# Patient Record
Sex: MALE | Race: WHITE | URBAN - NONMETROPOLITAN AREA
[De-identification: names, ages, dates, MRNs, and addresses within clinical notes are randomized per-mention and may not be internally consistent; named-entity substitution may affect disease eponyms.]

---

## 2021-04-19 ENCOUNTER — OFFICE VISIT (OUTPATIENT)
Dept: URBAN - NONMETROPOLITAN AREA CLINIC 19 | Facility: CLINIC | Age: 36
End: 2021-04-19
Payer: MEDICARE

## 2021-04-19 DIAGNOSIS — E11.9 TYPE 2 DIABETES MELLITUS W/O COMPLICATION: Primary | ICD-10-CM

## 2021-04-19 PROCEDURE — 92004 COMPRE OPH EXAM NEW PT 1/>: CPT | Performed by: OPHTHALMOLOGY

## 2021-04-19 ASSESSMENT — VISUAL ACUITY: OS: 20/20

## 2021-04-19 ASSESSMENT — INTRAOCULAR PRESSURE
OD: 9
OS: 12

## 2021-04-19 NOTE — IMPRESSION/PLAN
Impression: Type 2 diabetes mellitus w/o complication: W06.5. Plan: No evidence of diabetic retinopathy seen on today's exam in either eye. Ophthalmic pathologies associated with diabetes explained to patient. I have stressed the importance of patient maintaining good blood sugar control. I will continue to monitor patient's ocular status closely. Patient will return for follow-up appointment and repeat dilation.

## 2021-04-21 ENCOUNTER — OFFICE VISIT (OUTPATIENT)
Dept: URBAN - METROPOLITAN AREA CLINIC 91 | Facility: CLINIC | Age: 36
End: 2021-04-21
Payer: MEDICARE

## 2021-04-21 DIAGNOSIS — H52.13 MYOPIA, BILATERAL: ICD-10-CM

## 2021-04-21 PROCEDURE — 92083 EXTENDED VISUAL FIELD XM: CPT | Performed by: OPHTHALMOLOGY

## 2021-04-21 PROCEDURE — 99213 OFFICE O/P EST LOW 20 MIN: CPT | Performed by: OPHTHALMOLOGY

## 2021-04-21 PROCEDURE — 92250 FUNDUS PHOTOGRAPHY W/I&R: CPT | Performed by: OPHTHALMOLOGY

## 2021-04-21 PROCEDURE — 92133 CPTRZD OPH DX IMG PST SGM ON: CPT | Performed by: OPHTHALMOLOGY

## 2021-04-21 ASSESSMENT — INTRAOCULAR PRESSURE
OD: 17
OS: 17

## 2021-04-21 NOTE — IMPRESSION/PLAN
Impression: Cancer of pituitary gland: C75.1. Plan: Pituitary gland stable, VF non specific, will continue to monitor.

## 2021-08-25 ENCOUNTER — OFFICE VISIT (OUTPATIENT)
Dept: URBAN - METROPOLITAN AREA CLINIC 91 | Facility: CLINIC | Age: 36
End: 2021-08-25
Payer: MEDICARE

## 2021-08-25 DIAGNOSIS — C75.1: Primary | ICD-10-CM

## 2021-08-25 DIAGNOSIS — R51.9 HEADACHE, UNSPECIFIED: ICD-10-CM

## 2021-08-25 PROCEDURE — 92133 CPTRZD OPH DX IMG PST SGM ON: CPT | Performed by: OPHTHALMOLOGY

## 2021-08-25 PROCEDURE — 92250 FUNDUS PHOTOGRAPHY W/I&R: CPT | Performed by: OPHTHALMOLOGY

## 2021-08-25 PROCEDURE — 99214 OFFICE O/P EST MOD 30 MIN: CPT | Performed by: OPHTHALMOLOGY

## 2021-08-25 ASSESSMENT — INTRAOCULAR PRESSURE
OS: 15
OD: 16

## 2021-08-25 NOTE — IMPRESSION/PLAN
Impression: Cancer of pituitary gland: C75.1. Plan: Discussed with patient the effects of Pituitary gland and optic nerves. Patient will return for VF 30-2, OCTg, and Fundus photos.

## 2021-09-01 NOTE — IMPRESSION/PLAN
Impression: Headache, unspecified: R51.9.  Plan: Discussed due to worsening of headaches and Hx of pituitary cancer will obtain MRI of the brain with and without contrast.

## 2021-09-01 NOTE — IMPRESSION/PLAN
Impression: Cancer of pituitary gland: C75.1.  Plan: Due to worsening symptoms, I am referring patient to obtain a MRI of the Pituitary w/wo contrast.

## 2022-01-25 ENCOUNTER — OFFICE VISIT (OUTPATIENT)
Dept: URBAN - NONMETROPOLITAN AREA CLINIC 19 | Facility: CLINIC | Age: 37
End: 2022-01-25
Payer: MEDICARE

## 2022-01-25 PROCEDURE — 99212 OFFICE O/P EST SF 10 MIN: CPT | Performed by: OPHTHALMOLOGY

## 2022-01-25 ASSESSMENT — INTRAOCULAR PRESSURE
OS: 13
OD: 17

## 2022-01-25 NOTE — IMPRESSION/PLAN
Impression: Headache, unspecified: R51.9. Plan: Vision is considered stable, encouraged patient to have MRI done as previously ordered.

## 2022-10-24 NOTE — IMPRESSION/PLAN
Impression: Cancer of pituitary gland: C75.1. Plan: Patient did not make MRI appointment, instructed patient to obtain MRI first and then we may review and have 30-2 VF done. Yes